# Patient Record
Sex: MALE | Race: OTHER | HISPANIC OR LATINO | URBAN - METROPOLITAN AREA
[De-identification: names, ages, dates, MRNs, and addresses within clinical notes are randomized per-mention and may not be internally consistent; named-entity substitution may affect disease eponyms.]

---
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
## 2021-02-06 ENCOUNTER — EMERGENCY (EMERGENCY)
Facility: HOSPITAL | Age: 21
LOS: 0 days | Discharge: HOME | End: 2021-02-06
Attending: EMERGENCY MEDICINE | Admitting: EMERGENCY MEDICINE
Payer: MEDICAID

## 2021-02-06 VITALS
OXYGEN SATURATION: 98 % | TEMPERATURE: 98 F | HEART RATE: 75 BPM | DIASTOLIC BLOOD PRESSURE: 72 MMHG | SYSTOLIC BLOOD PRESSURE: 132 MMHG | RESPIRATION RATE: 18 BRPM | WEIGHT: 160.06 LBS

## 2021-02-06 DIAGNOSIS — K08.89 OTHER SPECIFIED DISORDERS OF TEETH AND SUPPORTING STRUCTURES: ICD-10-CM

## 2021-02-06 PROCEDURE — 64400 NJX AA&/STRD TRIGEMINAL NRV: CPT

## 2021-02-06 PROCEDURE — 99283 EMERGENCY DEPT VISIT LOW MDM: CPT | Mod: 25

## 2021-02-06 RX ORDER — BUPIVACAINE HCL/PF 7.5 MG/ML
5 VIAL (ML) INJECTION ONCE
Refills: 0 | Status: COMPLETED | OUTPATIENT
Start: 2021-02-06 | End: 2021-02-06

## 2021-02-06 RX ADMIN — Medication 5 MILLILITER(S): at 21:34

## 2021-02-06 NOTE — ED PROVIDER NOTE - NS ED ROS FT
GEN:  no fever, no chills, no generalized weakness  NEURO:  no headache, no dizziness  ENT: +right lower tooth pain, no sore throat, no runny nose  CV:  no chest pain, no palpitations  RESP:  no sob, no cough  GI:  no nausea, no vomiting, no abdominal pain, no diarrhea  :  no dysuria, no urinary frequency, no hematuria  SKIN:  no rash, no bruising  HEME: no hematochezia, no melena

## 2021-02-06 NOTE — ED PROVIDER NOTE - OBJECTIVE STATEMENT
21 yo male, no PMH, presents with 1 week of sharp constant lower tooth pain, non-radiating, worsening, not alleviated with ibuprofen. He has an appointment later this week with the dentist for a wisdom tooth removal. Denies drainage, fever, chills, headache.

## 2021-02-06 NOTE — ED PROVIDER NOTE - NSFOLLOWUPCLINICS_GEN_ALL_ED_FT
[facility]  Dental  [address] [place] [id number]  Phone: [phone]  Fax:   Follow Up Time: 1-3 Days

## 2021-02-06 NOTE — ED PROVIDER NOTE - CLINICAL SUMMARY MEDICAL DECISION MAKING FREE TEXT BOX
PT presented to ED with dental pain. No infection or abscess. Dental block given. Will dc. Pt has dental appt tomorrow. Results reviewed and discussed with pt and printed for patient. Anticipatory guidance given including close outpatient followup. Strict return precautions given. Pt verbalizes understanding of and agrees with plan.

## 2021-02-06 NOTE — ED PROVIDER NOTE - ATTENDING CONTRIBUTION TO CARE
I personally evaluated the patient. I reviewed the Resident’s or Physician Assistant’s note (as assigned above), and agree with the findings and plan except as documented in my note.    Pt is a 19 y/o male who presented with dental pain to right lower posterior molar for 1 week moderate, constant, no change since onset. Pt taking motrin but pain persistent. Pt has appt with dental next week.    Constitutional: Well developed, well nourished. NAD.  Head: Normocephalic.  Eyes: EOMI.  Dental: + wisdom tooth coming in on bottom right.  No abscess or infection.  Cardiovascular: Normal S1, S2. Regular rate and rhythm. No murmurs, rubs, or gallops.  Pulmonary: Normal respiratory rate and effort. Lungs clear to auscultation bilaterally. No wheezing, rales, or rhonchi.  Neuro: No focal neurological deficits.

## 2021-02-06 NOTE — ED ADULT NURSE NOTE - OBJECTIVE STATEMENT
Pt complaining of wisdom tooth pain x 1 week. Pt tried ibuprofen but pain persist. Denies fever, chills. Pt states he has already scheduled to have tooth taken out this week.  dental pain/injury

## 2021-02-06 NOTE — ED PROVIDER NOTE - PATIENT PORTAL LINK FT
You can access the FollowMyHealth Patient Portal offered by [facility] by registering at the following website: [email or web address]. By joining [employer]s FollowMyHealth portal, you will also be able to view your health information using other applications (apps) compatible with our system.

## 2021-02-06 NOTE — ED PROVIDER NOTE - PHYSICAL EXAMINATION
CONSTITUTIONAL: Well-developed; well-nourished; in no acute distress.   SKIN: warm, dry  HEAD: Normocephalic; atraumatic.  EYES: no conjunctival injection. PERRLA. EOMI.   ENT: +right inferior wisdom tooth emerging. no abscess or discharge or erythema. no ear pain. No nasal discharge; airway clear.  NECK: Supple; non tender.  CARD: S1, S2 normal; no murmurs, gallops, or rubs. Regular rate and rhythm.   RESP: No wheezes, rales or rhonchi  EXT: Normal ROM.  No clubbing, cyanosis or edema.   NEURO: Alert, oriented, grossly unremarkable.  PSYCH: Cooperative, appropriate.

## 2021-02-06 NOTE — ED ADULT TRIAGE NOTE - CHIEF COMPLAINT QUOTE
Pt complaining of wisdom tooth pain x 1 week. Pt tried ibuprofen but pain persist. Denies fever, chills. Pt states he has already scheduled to have tooth taken out this week.

## 2021-02-06 NOTE — ED PROCEDURE NOTE - CPROC ED POST PROC CARE GUIDE1
Verbal/written post procedure instructions were given to patient/caregiver./Instructed patient/caregiver regarding signs and symptoms of infection./Instructed patient/caregiver to follow-up with primary dentist./Avoid solid food./Avoid hot food.

## 2021-02-06 NOTE — ED PROVIDER NOTE - NSFOLLOWUPINSTRUCTIONS_ED_ALL_ED_FT
Dental Pain    Dental pain may be caused by many things, including:  •Tooth decay (cavities or caries). Cavities expose the nerve of your tooth to air and to hot or cold temperatures. This can cause pain or discomfort.      •Abscess or infection. A dental abscess is a collection of pus from a bacterial infection in the inner part of the tooth (pulp). It usually occurs at the end of the root of a tooth.      •Injury.      •An unknown reason (idiopathic).    Your pain may be mild or severe. It may occur when you are:  •Chewing.      •Exposed to hot or cold temperatures.      •Eating or drinking sugary foods or beverages, such as soda or candy.      Your pain may be constant, or it may come and go without cause.      Follow these instructions at home:     Watch your dental pain for any changes. The following actions may help to lessen any discomfort that you are feeling:    Medicines     •Take over-the-counter and prescription medicines only as told by your health care provider.      •If you were prescribed an antibiotic medicine, take it as told by your health care provider. Do not stop taking the antibiotic even if you start to feel better.      Eating and drinking   •Avoid foods or drinks that cause you pain, such as:  •Very hot or very cold foods or drinks.      •Sweet or sugary foods or drinks.        Managing pain and swelling   •Apply ice to the painful area of your face:  •Put ice in a plastic bag.      •Place a towel between your skin and the bag.      •Leave the ice on for 20 minutes, 2–3 times a day.        Brushing your teeth     •To keep your mouth and gums healthy, use fluoride toothpaste to brush your teeth twice a day. Floss once a day.      •Use a toothpaste made for sensitive teeth if directed by your health care provider.      •Brush your teeth with a soft-bristled toothbrush.      General instructions     • Do not apply heat to the outside of your face.      •Gargle with a salt-water mixture 3–4 times a day or as needed. To make a salt-water mixture, completely dissolve ½–1 tsp of salt in 1 cup of warm water.      •Keep all follow-up visits as told by your health care provider. This is important.      •Apply ice to the outside of your jaw if there is swelling. Do not put ice directly on the skin.        Contact a health care provider if:    •Your pain is not controlled with medicines.      •Your symptoms get worse.      •You have new symptoms.        Get help right away if you:    •Are unable to open your mouth.      •Are having trouble breathing or swallowing.      •Have a fever.      •Notice that your face, neck, or jaw is swollen.        Summary    •Dental pain may be caused by many things, including tooth decay and infection.      •Your pain may be mild or severe.      •Take over-the-counter and prescription medicines only as told by your health care provider.      •Watch your dental pain for any changes. Let your health care provider know if symptoms get worse.      This information is not intended to replace advice given to you by your health care provider. Make sure you discuss any questions you have with your health care provider.

## 2021-11-19 ENCOUNTER — OUTPATIENT (OUTPATIENT)
Dept: OUTPATIENT SERVICES | Facility: HOSPITAL | Age: 21
LOS: 1 days | Discharge: HOME | End: 2021-11-19

## 2021-11-19 DIAGNOSIS — K02.63 DENTAL CARIES ON SMOOTH SURFACE PENETRATING INTO PULP: ICD-10-CM

## 2021-11-19 PROBLEM — Z78.9 OTHER SPECIFIED HEALTH STATUS: Chronic | Status: ACTIVE | Noted: 2021-02-06

## 2023-10-18 NOTE — ED ADULT NURSE NOTE - PRO INTERPRETER NEED 2
Situation   Onset: 1 month  What is happening: R foot pain that comes and goes, worse in the morning and goes away throughout the day x 1 month. Pain about 4/10 at worst    Background   Current medications: Aleve PRN  Home remedies: rest    Assessment   Symptoms: R foot pain that comes and goes, worse in the morning and goes away throughout the day x 1 month. Pain about 4/10 at worst  Response to medication: improved    Recommendation   Pt stated that he has been having R foot pain that comes and goes, worse in the morning and goes away throughout the day x 1 month. Pain about 4/10 at worst. Pt denies any swelling, calf swelling, discoloration, injury, numbness, tingling, chest pain, SOB, weakness, fevers, signs of infection, or any other symptoms. RN advised pt should be evaluated to determine the cause of the pain. RN advised pt to monitor symptoms if any swelling, numbness, tingling, or severe pain occur to go to ED. RN scheduled appt with MAGDA Novak 10/24.     English

## 2025-01-22 NOTE — ED ADULT NURSE NOTE - CAS DISCH ACCOMP BY
Self [No Acute Distress] : no acute distress [Normal Sclera/Conjunctiva] : normal sclera/conjunctiva [PERRL] : pupils equal round and reactive to light [Normal Oropharynx] : the oropharynx was normal [Normal TMs] : both tympanic membranes were normal [No JVD] : no jugular venous distention [Supple] : supple [Thyroid Normal, No Nodules] : the thyroid was normal and there were no nodules present [No Accessory Muscle Use] : no accessory muscle use [Clear to Auscultation] : lungs were clear to auscultation bilaterally [Regular Rhythm] : with a regular rhythm [Normal S1, S2] : normal S1 and S2 [No Edema] : there was no peripheral edema [Soft] : abdomen soft [Non Tender] : non-tender [No Masses] : no abdominal mass palpated [No Spinal Tenderness] : no spinal tenderness [No Joint Swelling] : no joint swelling [No Focal Deficits] : no focal deficits [Normal Gait] : normal gait [Deep Tendon Reflexes (DTR)] : deep tendon reflexes were 2+ and symmetric [No Abdominal Bruit] : a ~M bruit was not heard ~T in the abdomen [Non-distended] : non-distended [No HSM] : no HSM [Normal Bowel Sounds] : normal bowel sounds [Normal Posterior Cervical Nodes] : no posterior cervical lymphadenopathy [Normal Anterior Cervical Nodes] : no anterior cervical lymphadenopathy [No CVA Tenderness] : no CVA  tenderness [Coordination Grossly Intact] : coordination grossly intact [Normal Affect] : the affect was normal [Normal Insight/Judgement] : insight and judgment were intact [Kyphosis] : no kyphosis [de-identified] : calm and engaging  [de-identified] :  MASK   [de-identified] : no tremors